# Patient Record
Sex: FEMALE | Race: WHITE | Employment: UNEMPLOYED | RURAL
[De-identification: names, ages, dates, MRNs, and addresses within clinical notes are randomized per-mention and may not be internally consistent; named-entity substitution may affect disease eponyms.]

---

## 2020-01-14 ENCOUNTER — HOSPITAL ENCOUNTER (OUTPATIENT)
Facility: HOSPITAL | Age: 20
Discharge: HOME | End: 2020-01-14
Payer: MEDICAID

## 2020-01-14 LAB
IRON: 126 UG/DL (ref 37–145)
RAPID INFLUENZA  B AGN: NEGATIVE
RAPID INFLUENZA A AGN: NEGATIVE

## 2020-01-14 PROCEDURE — 83540 ASSAY OF IRON: CPT

## 2020-01-14 PROCEDURE — 87804 INFLUENZA ASSAY W/OPTIC: CPT

## 2020-01-28 ENCOUNTER — HOSPITAL ENCOUNTER (EMERGENCY)
Facility: HOSPITAL | Age: 20
Discharge: HOME | End: 2020-01-28
Attending: EMERGENCY MEDICINE
Payer: MEDICAID

## 2020-01-28 ENCOUNTER — APPOINTMENT (OUTPATIENT)
Dept: GENERAL RADIOLOGY | Facility: HOSPITAL | Age: 20
End: 2020-01-28
Payer: MEDICAID

## 2020-01-28 VITALS
SYSTOLIC BLOOD PRESSURE: 119 MMHG | RESPIRATION RATE: 20 BRPM | OXYGEN SATURATION: 98 % | WEIGHT: 120.5 LBS | TEMPERATURE: 97.8 F | HEIGHT: 67 IN | DIASTOLIC BLOOD PRESSURE: 73 MMHG | HEART RATE: 100 BPM | BODY MASS INDEX: 18.91 KG/M2

## 2020-01-28 LAB — HCG(URINE) PREGNANCY TEST: NEGATIVE

## 2020-01-28 PROCEDURE — 73110 X-RAY EXAM OF WRIST: CPT

## 2020-01-28 PROCEDURE — 84703 CHORIONIC GONADOTROPIN ASSAY: CPT

## 2020-01-28 PROCEDURE — 99283 EMERGENCY DEPT VISIT LOW MDM: CPT

## 2020-01-28 SDOH — HEALTH STABILITY: MENTAL HEALTH: HOW OFTEN DO YOU HAVE A DRINK CONTAINING ALCOHOL?: NEVER

## 2020-01-28 ASSESSMENT — PAIN DESCRIPTION - PAIN TYPE: TYPE: ACUTE PAIN

## 2020-01-28 ASSESSMENT — PAIN DESCRIPTION - DESCRIPTORS: DESCRIPTORS: TENDER;SHARP

## 2020-01-28 ASSESSMENT — PATIENT HEALTH QUESTIONNAIRE - PHQ9: SUM OF ALL RESPONSES TO PHQ QUESTIONS 1-9: 8

## 2020-01-28 ASSESSMENT — PAIN DESCRIPTION - LOCATION: LOCATION: WRIST

## 2020-01-28 ASSESSMENT — PAIN DESCRIPTION - ORIENTATION: ORIENTATION: LEFT

## 2020-01-28 ASSESSMENT — PAIN SCALES - GENERAL: PAINLEVEL_OUTOF10: 4

## 2020-01-29 NOTE — ED NOTES
Discharge instructions given at this time. Patient verbalized understanding. No questions or concerns at this time.       Mariposa Solorio RN  01/28/20 2052

## 2020-01-29 NOTE — ED PROVIDER NOTES
[address] [facility] [address]  eMERGENCY dEPARTMENT eNCOUnter      Pt Name: Dilshad Nolan  MRN: [id number]  [place]: 2000  Date ofevaluation: 1/03/8408  Provider: Breanne Vegas MD    CHIEF COMPLAINT       Chief Complaint   Patient presents with    Wrist Pain     left         HISTORY OF PRESENT ILLNESS  (Location/Symptom, Timing/Onset, Context/Setting, Quality, Duration, Modifying Factors, Severity.)   Dilshad Nolan is a 23 y.o. female who presents to the emergency department with left wrist pain. She fell 2 days ago but continues to have pain. She decided she should come for an evaluation. Patient thinks she could be pregnant. She has nausea. Nursing notes were reviewed. REVIEW OF SYSTEMS    (2-9systems for level 4, 10 or more for level 5)   ROS:  General:  No fevers, no chills, no weakness  HEENT: No sore throat, runny nose or ear pain  Cardiovascular:  No chest pain, no palpitations  Respiratory:  No shortness of breath, no cough, no wheezing  Gastrointestinal:  No pain, no nausea, no vomiting, no diarrhea  Musculoskeletal:  No muscle pain, + left wrist pain  Skin:  No rash, no easy bruising  Genitourinary:  No dysuria, no hematuria    Except as noted above theremainder of the review of systems was reviewed and negative. PASTMEDICAL HISTORY     Past Medical History:   Diagnosis Date    Arthritis     Asthma     Depression     Headache     Seizures ([facility] [address]          SURGICAL HISTORY       Past Surgical History:   Procedure Laterality Date    CHOLECYSTECTOMY      HIP SURGERY Left     TONSILLECTOMY           CURRENT MEDICATIONS       Previous Medications    No medications on file       ALLERGIES     Influenza vaccines; Tegretol [carbamazepine]; and Trileptal [oxcarbazepine]    FAMILY HISTORY     History reviewed. No pertinent family history.        SOCIAL HISTORY       Social History     Socioeconomic History    Marital status: Single     Spouse name: None    Number of dry  Psych: Mentation is grossly normal, cognition is grossly normal. Affect is appropriate. DIAGNOSTIC RESULTS       RADIOLOGY:   Non-plain film images such as CT, Ultrasoundand MRI are read by the radiologist. Plain radiographic images are visualized and preliminarily interpreted by the emergency physician with the below findings:      [] Radiologist's Report Reviewed:  XR WRIST LEFT (MIN 3 VIEWS)   Final Result      No acute bony findings. Mild soft tissue swelling            ED BEDSIDE ULTRASOUND:   Performed by ED Physician - none    LABS:  Labs Reviewed   PREGNANCY, URINE    Narrative:     Performed at:  [address] [place] [address] Laboratory  [address] [place] [address],  [place], [place] [address]   Phone [phone]       I have reviewed and interpreted all of the currently available lab resultsfrom this visit (if applicable):  Results for orders placed or performed during the hospital encounter of 01/28/20   Pregnancy, Urine   Result Value Ref Range    HCG(Urine) Pregnancy Test Negative Detects HCG level >20 MIU/mL        All other labs were within normal range or not returned as of this dictation. EMERGENCY DEPARTMENT COURSE and DIFFERENTIAL DIAGNOSIS/MDM:   Vitals:    Vitals:    01/28/20 1950   BP: 119/73   Pulse: 100   Resp: 20   Temp: 97.8 °F (36.6 °C)   TempSrc: Oral   SpO2: 98%   Weight: 120 lb 8 oz (54.7 kg)   Height: 5' 6.5\" (1.689 m)         The patient will follow-up with their PCP in 1-2 days for reevaluation. If the patient or family members have any further concerns or any worsening symptoms they will return to the ED for reevaluation. CONSULTS:  None    PROCEDURES:  Procedures    CRITICAL CARE TIME    Total Critical Care time was 0 minutes, excluding separately reportable procedures. There was a high probability of clinically significant/life threatening deterioration in the patient's condition which required my urgent intervention. FINAL IMPRESSION      1.  Sprain of left wrist, initial encounter          DISPOSITION/PLAN   DISPOSITION        PATIENT REFERRED TO:  No follow-up provider specified. DISCHARGE MEDICATIONS:  New Prescriptions    No medications on file       Comment: Please note this report has been produced using speech recognition software and may contain errors related tothat system including errors in grammar, punctuation, and spelling, as well as words and phrases that may be inappropriate. If there are any questions or concerns please feel free to contact the dictating provider forclarification.     Breanne Vegas MD  Attending Emergency Physician                  Breanne Vegas MD  01/28/20 2030

## 2020-02-04 ENCOUNTER — HOSPITAL ENCOUNTER (OUTPATIENT)
Facility: HOSPITAL | Age: 20
Discharge: HOME | End: 2020-02-04
Payer: MEDICAID

## 2020-02-04 LAB — GONADOTROPIN, CHORIONIC (HCG) QUANT: <0.1 MIU/ML

## 2020-02-04 PROCEDURE — 84702 CHORIONIC GONADOTROPIN TEST: CPT

## 2020-03-14 ENCOUNTER — HOSPITAL ENCOUNTER (EMERGENCY)
Facility: HOSPITAL | Age: 20
Discharge: HOME | End: 2020-03-14
Attending: EMERGENCY MEDICINE
Payer: MEDICAID

## 2020-03-14 VITALS
OXYGEN SATURATION: 99 % | TEMPERATURE: 98.6 F | WEIGHT: 123 LBS | HEART RATE: 103 BPM | HEIGHT: 67 IN | SYSTOLIC BLOOD PRESSURE: 108 MMHG | BODY MASS INDEX: 19.3 KG/M2 | DIASTOLIC BLOOD PRESSURE: 61 MMHG | RESPIRATION RATE: 18 BRPM

## 2020-03-14 PROCEDURE — 99282 EMERGENCY DEPT VISIT SF MDM: CPT

## 2020-03-14 PROCEDURE — 96372 THER/PROPH/DIAG INJ SC/IM: CPT

## 2020-03-14 PROCEDURE — 6360000002 HC RX W HCPCS: Performed by: EMERGENCY MEDICINE

## 2020-03-14 RX ORDER — CYCLOBENZAPRINE HCL 10 MG
10 TABLET ORAL 3 TIMES DAILY PRN
Qty: 15 TABLET | Refills: 0 | Status: SHIPPED | OUTPATIENT
Start: 2020-03-14 | End: 2020-03-19

## 2020-03-14 RX ORDER — DEXAMETHASONE SODIUM PHOSPHATE 10 MG/ML
10 INJECTION INTRAMUSCULAR; INTRAVENOUS ONCE
Status: COMPLETED | OUTPATIENT
Start: 2020-03-14 | End: 2020-03-14

## 2020-03-14 RX ADMIN — DEXAMETHASONE SODIUM PHOSPHATE 10 MG: 10 INJECTION INTRAMUSCULAR; INTRAVENOUS at 12:59

## 2020-03-14 ASSESSMENT — ENCOUNTER SYMPTOMS
ABDOMINAL PAIN: 0
CHEST TIGHTNESS: 0
EYE REDNESS: 0
EYE DISCHARGE: 0
TROUBLE SWALLOWING: 0
CONSTIPATION: 0
SHORTNESS OF BREATH: 0
VOMITING: 0
SINUS PRESSURE: 0
WHEEZING: 0
BACK PAIN: 0
DIARRHEA: 0
NAUSEA: 0
EYE PAIN: 0
SORE THROAT: 0
COUGH: 0
RHINORRHEA: 0

## 2020-03-14 ASSESSMENT — PAIN SCALES - GENERAL: PAINLEVEL_OUTOF10: 7

## 2020-03-14 NOTE — ED PROVIDER NOTES
on flexeril prn. FINAL IMPRESSION      1. Torticollis          Final diagnoses:   Torticollis       DISPOSITION/PLAN   DISPOSITION Decision To Discharge 03/14/2020 12:57:28 PM      PATIENT REFERRED TO:  [facility] Emergency Department  [address]   [facility]  [phone]    If symptoms worsen      DISCHARGE MEDICATIONS:  New Prescriptions    CYCLOBENZAPRINE (FLEXERIL) 10 MG TABLET    Take 1 tablet by mouth 3 times daily as needed for Muscle spasms         (Please note thatportions of this note may have been completed with a voice recognition program.  Efforts were [facility] edit the dictations but occasionally words are mis-transcribed.)    Eleazar Noyola MD (electronically signed)  Attending Emergency Physician        Servando Mcdonald MD  03/14/20 5428